# Patient Record
Sex: MALE | Race: WHITE | NOT HISPANIC OR LATINO | Employment: FULL TIME | URBAN - METROPOLITAN AREA
[De-identification: names, ages, dates, MRNs, and addresses within clinical notes are randomized per-mention and may not be internally consistent; named-entity substitution may affect disease eponyms.]

---

## 2020-01-13 ENCOUNTER — HOSPITAL ENCOUNTER (EMERGENCY)
Facility: HOSPITAL | Age: 40
Discharge: HOME | End: 2020-01-14
Attending: EMERGENCY MEDICINE | Admitting: EMERGENCY MEDICINE

## 2020-01-13 VITALS
HEIGHT: 74 IN | WEIGHT: 298 LBS | RESPIRATION RATE: 18 BRPM | TEMPERATURE: 99.8 F | SYSTOLIC BLOOD PRESSURE: 136 MMHG | HEART RATE: 100 BPM | DIASTOLIC BLOOD PRESSURE: 85 MMHG | OXYGEN SATURATION: 96 % | BODY MASS INDEX: 38.24 KG/M2

## 2020-01-13 DIAGNOSIS — K08.89 PAIN, DENTAL: Primary | ICD-10-CM

## 2020-01-13 DIAGNOSIS — R50.9 FEBRILE ILLNESS: ICD-10-CM

## 2020-01-13 PROCEDURE — 99283 EMERGENCY DEPT VISIT LOW MDM: CPT

## 2020-01-13 PROCEDURE — 87804 INFLUENZA ASSAY W/OPTIC: CPT | Performed by: EMERGENCY MEDICINE

## 2020-01-13 RX ORDER — ACETAMINOPHEN 500 MG
1000 TABLET ORAL ONCE
Status: COMPLETED | OUTPATIENT
Start: 2020-01-13 | End: 2020-01-13

## 2020-01-13 RX ORDER — CLINDAMYCIN HYDROCHLORIDE 150 MG/1
300 CAPSULE ORAL ONCE
Status: COMPLETED | OUTPATIENT
Start: 2020-01-13 | End: 2020-01-13

## 2020-01-13 RX ADMIN — ACETAMINOPHEN 1000 MG: 500 TABLET, FILM COATED ORAL at 22:44

## 2020-01-13 RX ADMIN — CLINDAMYCIN HYDROCHLORIDE 300 MG: 150 CAPSULE ORAL at 22:44

## 2020-01-14 LAB
FLUAV AG NPH QL: NEGATIVE
FLUBV AG NPH QL IA: NEGATIVE

## 2020-01-14 NOTE — ED PROVIDER NOTES
"Subjective   39-year-old male presents for evaluation of fever, chills, and myalgias.  Of note, the patient works here at our facility and has been around sick contacts with influenza over the past week.  He states that earlier today, he had a \"tooth pulled\" for a dental abscess in his right lower mandibular region.  He was prescribed clindamycin postoperatively but has not yet taken a dose.  Tonight, he began experiencing fever, chills, and diffuse myalgias, prompting his visit to the emergency department.  He denies any cough or congestion.  No sore throat.      History provided by:  Patient  Fever   Max temp prior to arrival:  100.8  Temp source:  Unable to specify  Severity:  Mild  Onset quality:  Sudden  Duration:  5 hours  Timing:  Constant  Progression:  Unchanged  Chronicity:  New  Relieved by:  None tried  Worsened by:  Nothing  Ineffective treatments:  None tried  Associated symptoms: chills and myalgias (mild generalized)    Associated symptoms: no congestion and no cough    Risk factors: sick contacts        Review of Systems   Constitutional: Positive for chills and fever.   HENT: Positive for dental problem (pain from recent tooth extraction). Negative for congestion.    Respiratory: Negative for cough.    Musculoskeletal: Positive for myalgias (mild generalized).   All other systems reviewed and are negative.      No past medical history on file.    Allergies   Allergen Reactions   • Contrast Dye Rash   • Penicillins Rash       No past surgical history on file.    No family history on file.    Social History     Socioeconomic History   • Marital status: Single     Spouse name: Not on file   • Number of children: Not on file   • Years of education: Not on file   • Highest education level: Not on file         Objective   Physical Exam   Constitutional: He is oriented to person, place, and time. He appears well-developed and well-nourished. No distress.   Well-appearing male in no acute distress   HENT: "   Head: Normocephalic and atraumatic.   Right Ear: External ear normal.   Left Ear: External ear normal.   Posterior oropharynx is clear without tonsillar exudate or erythema, uvula midline, no trismus, no tonsillar asymmetry    Poor dentition noted throughout with multiple dental caries present, no obvious visible or palpable abscess noted to right mandibular region    No mastoid tenderness appreciated    Tympanic membranes unremarkable in appearance bilaterally   Eyes: Pupils are equal, round, and reactive to light. EOM are normal.   Neck: Normal range of motion. Neck supple. No JVD present.   No meningeal signs or nuchal rigidity   Cardiovascular: Normal rate, regular rhythm and normal heart sounds. Exam reveals no gallop and no friction rub.   No murmur heard.  Pulmonary/Chest: Effort normal and breath sounds normal. No respiratory distress. He has no wheezes. He has no rales.   Abdominal: Soft. Bowel sounds are normal. He exhibits no distension and no mass. There is no tenderness. There is no guarding.   Musculoskeletal: Normal range of motion.   Neurological: He is alert and oriented to person, place, and time. No cranial nerve deficit.   Skin: Skin is warm and dry. No rash noted. He is not diaphoretic. No erythema. No pallor.   Psychiatric: He has a normal mood and affect. Judgment and thought content normal.   Nursing note and vitals reviewed.      Procedures         ED Course  ED Course as of Jan 14 0003 Mon Jan 13, 2020 2146 39-year-old male presents for evaluation of fever, chills, and myalgias x1 day.  Of note, the patient had a tooth pulled earlier today and was prescribed clindamycin postoperatively but has not yet taken any doses.  Additionally, he has been around sick contacts with influenza.  On arrival to the ED, patient with elevated temperature but is nontoxic-appearing.  His oropharynx is clear.  No clear visible or palpable dental abscess noted.  Clindamycin given here in the emergency  "department.  We will check the patient for influenza and will reassess following Tylenol.    [DD]   Tue Jan 14, 2020   0002 Influenza negative.  Patient reassured and counseled regarding symptomatic management and advised to take his clindamycin as prescribed.  Doubt emergent process at this time.  Doubt emergent deep space infection or ENT pathology.  Ears clear.  No mastoid tenderness noted.  He will follow-up with his dental provider within the next week.  Agreeable with plan and given appropriate strict return precautions.    [DD]      ED Course User Index  [DD] Erik Ledbetter MD     No results found for this or any previous visit (from the past 24 hour(s)).  Note: In addition to lab results from this visit, the labs listed above may include labs taken at another facility or during a different encounter within the last 24 hours. Please correlate lab times with ED admission and discharge times for further clarification of the services performed during this visit.    No orders to display     Vitals:    01/13/20 1927   BP: 136/85   BP Location: Left arm   Patient Position: Sitting   Pulse: 100   Resp: 18   Temp: 99.8 °F (37.7 °C)   TempSrc: Oral   SpO2: 96%   Weight: 135 kg (298 lb)   Height: 188 cm (74\")     Medications - No data to display  ECG/EMG Results (last 24 hours)     ** No results found for the last 24 hours. **        No orders to display                                              Recent Results (from the past 24 hour(s))   Influenza Antigen, Rapid - Swab, Nasopharynx    Collection Time: 01/13/20 10:21 PM   Result Value Ref Range    Influenza A Ag, EIA Negative Negative    Influenza B Ag, EIA Negative Negative     Note: In addition to lab results from this visit, the labs listed above may include labs taken at another facility or during a different encounter within the last 24 hours. Please correlate lab times with ED admission and discharge times for further clarification of the services " "performed during this visit.    No orders to display     Vitals:    01/13/20 1927   BP: 136/85   BP Location: Left arm   Patient Position: Sitting   Pulse: 100   Resp: 18   Temp: 99.8 °F (37.7 °C)   TempSrc: Oral   SpO2: 96%   Weight: 135 kg (298 lb)   Height: 188 cm (74\")     Medications   acetaminophen (TYLENOL) tablet 1,000 mg (1,000 mg Oral Given 1/13/20 2244)   clindamycin (CLEOCIN) capsule 300 mg (300 mg Oral Given 1/13/20 2244)     ECG/EMG Results (last 24 hours)     ** No results found for the last 24 hours. **        No orders to display           MDM    Final diagnoses:   Pain, dental   Febrile illness       Documentation assistance provided by jannet Bains.  Information recorded by the scribe was done at my direction and has been verified and validated by me.     Velvet Bains  01/13/20 2139       Erik Ledbetter MD  01/14/20 0003    "

## 2020-01-14 NOTE — DISCHARGE INSTRUCTIONS
Please follow-up with your dental provider within the next week.      Follow up with one of the [facility] Primary Care Providers below to setup primary care. If you need assistance coordinating a primary care appointment with a [facility] Primary Care Provider, please contact the Primary Care Coordinators at [phone] for appointment scheduling.    [facility], Primary Care   [address], [address]   [place], [place] [address] [phone]    [facility] End[facility]ology  [address], [address]  [place] [address] [phone]    [facility]  [address], [address]   [place] [address] [phone]    [facility]  [address], [address]  [place], [place] [id number] [phone]    [facility], Primary Care,   [address], [address]   [place], [place] [id number] [phone]    [facility]  [address], [address]  [place], [place] [id number] [phone]    [facility], Primary Care  [address], [address]  [place] [address] [phone]    [facility]  [address], [address]   [place] [address] [phone]    [facility], Primary Care  [address] [place] [address] [phone]      [facility]  [address], [address]   [place] [address] [phone]    [facility]  [address] [place] [address] [phone]

## 2021-01-19 ENCOUNTER — IMMUNIZATION (OUTPATIENT)
Dept: VACCINE CLINIC | Facility: HOSPITAL | Age: 41
End: 2021-01-19

## 2021-01-19 PROCEDURE — 0001A: CPT | Performed by: INTERNAL MEDICINE

## 2021-01-19 PROCEDURE — 91300 HC SARSCOV02 VAC 30MCG/0.3ML IM: CPT | Performed by: INTERNAL MEDICINE

## 2021-02-09 ENCOUNTER — IMMUNIZATION (OUTPATIENT)
Dept: VACCINE CLINIC | Facility: HOSPITAL | Age: 41
End: 2021-02-09

## 2021-02-09 PROCEDURE — 91300 HC SARSCOV02 VAC 30MCG/0.3ML IM: CPT | Performed by: INTERNAL MEDICINE

## 2021-02-09 PROCEDURE — 0002A: CPT | Performed by: INTERNAL MEDICINE
